# Patient Record
(demographics unavailable — no encounter records)

---

## 2025-05-14 NOTE — HISTORY OF PRESENT ILLNESS
[FreeTextEntry1] : Encounter to discuss test results [de-identified] : Encounter to discuss test results also, c/o lower back pain x 2 years